# Patient Record
Sex: FEMALE | Race: WHITE | Employment: UNEMPLOYED | URBAN - METROPOLITAN AREA
[De-identification: names, ages, dates, MRNs, and addresses within clinical notes are randomized per-mention and may not be internally consistent; named-entity substitution may affect disease eponyms.]

---

## 2018-02-21 ENCOUNTER — HOSPITAL ENCOUNTER (EMERGENCY)
Facility: CLINIC | Age: 58
Discharge: HOME | End: 2018-02-21
Attending: EMERGENCY MEDICINE | Admitting: EMERGENCY MEDICINE
Payer: COMMERCIAL

## 2018-02-21 ENCOUNTER — APPOINTMENT (OUTPATIENT)
Dept: GENERAL RADIOLOGY | Facility: CLINIC | Age: 58
End: 2018-02-21
Attending: EMERGENCY MEDICINE
Payer: COMMERCIAL

## 2018-02-21 ENCOUNTER — APPOINTMENT (OUTPATIENT)
Dept: CT IMAGING | Facility: CLINIC | Age: 58
End: 2018-02-21
Attending: EMERGENCY MEDICINE
Payer: COMMERCIAL

## 2018-02-21 VITALS
OXYGEN SATURATION: 98 % | TEMPERATURE: 97.3 F | HEIGHT: 64 IN | WEIGHT: 228 LBS | DIASTOLIC BLOOD PRESSURE: 79 MMHG | BODY MASS INDEX: 38.93 KG/M2 | RESPIRATION RATE: 11 BRPM | SYSTOLIC BLOOD PRESSURE: 139 MMHG | HEART RATE: 58 BPM

## 2018-02-21 DIAGNOSIS — M79.602 PAIN OF LEFT UPPER EXTREMITY: ICD-10-CM

## 2018-02-21 DIAGNOSIS — R91.8 PULMONARY NODULES: ICD-10-CM

## 2018-02-21 DIAGNOSIS — R68.84 JAW PAIN: ICD-10-CM

## 2018-02-21 LAB
ALBUMIN SERPL-MCNC: 3.3 G/DL (ref 3.4–5)
ALP SERPL-CCNC: 59 U/L (ref 40–150)
ALT SERPL W P-5'-P-CCNC: 28 U/L (ref 0–50)
ANION GAP SERPL CALCULATED.3IONS-SCNC: 8 MMOL/L (ref 3–14)
AST SERPL W P-5'-P-CCNC: 18 U/L (ref 0–45)
BASOPHILS # BLD AUTO: 0.1 10E9/L (ref 0–0.2)
BASOPHILS NFR BLD AUTO: 0.9 %
BILIRUB SERPL-MCNC: 0.2 MG/DL (ref 0.2–1.3)
BUN SERPL-MCNC: 14 MG/DL (ref 7–30)
CALCIUM SERPL-MCNC: 8.5 MG/DL (ref 8.5–10.1)
CHLORIDE SERPL-SCNC: 105 MMOL/L (ref 94–109)
CO2 SERPL-SCNC: 27 MMOL/L (ref 20–32)
CREAT SERPL-MCNC: 0.65 MG/DL (ref 0.52–1.04)
D DIMER PPP FEU-MCNC: 2.8 UG/ML FEU (ref 0–0.5)
DIFFERENTIAL METHOD BLD: NORMAL
EOSINOPHIL # BLD AUTO: 0.3 10E9/L (ref 0–0.7)
EOSINOPHIL NFR BLD AUTO: 4.9 %
ERYTHROCYTE [DISTWIDTH] IN BLOOD BY AUTOMATED COUNT: 13.2 % (ref 10–15)
GFR SERPL CREATININE-BSD FRML MDRD: >90 ML/MIN/1.7M2
GLUCOSE SERPL-MCNC: 98 MG/DL (ref 70–99)
HCT VFR BLD AUTO: 40.8 % (ref 35–47)
HGB BLD-MCNC: 13.7 G/DL (ref 11.7–15.7)
IMM GRANULOCYTES # BLD: 0 10E9/L (ref 0–0.4)
IMM GRANULOCYTES NFR BLD: 0.4 %
INTERPRETATION ECG - MUSE: NORMAL
INTERPRETATION ECG - MUSE: NORMAL
LIPASE SERPL-CCNC: 287 U/L (ref 73–393)
LYMPHOCYTES # BLD AUTO: 2.1 10E9/L (ref 0.8–5.3)
LYMPHOCYTES NFR BLD AUTO: 39.9 %
MCH RBC QN AUTO: 30.2 PG (ref 26.5–33)
MCHC RBC AUTO-ENTMCNC: 33.6 G/DL (ref 31.5–36.5)
MCV RBC AUTO: 90 FL (ref 78–100)
MONOCYTES # BLD AUTO: 0.4 10E9/L (ref 0–1.3)
MONOCYTES NFR BLD AUTO: 7.5 %
NEUTROPHILS # BLD AUTO: 2.5 10E9/L (ref 1.6–8.3)
NEUTROPHILS NFR BLD AUTO: 46.4 %
NRBC # BLD AUTO: 0 10*3/UL
NRBC BLD AUTO-RTO: 0 /100
PLATELET # BLD AUTO: 232 10E9/L (ref 150–450)
POTASSIUM SERPL-SCNC: 4 MMOL/L (ref 3.4–5.3)
PROT SERPL-MCNC: 6.6 G/DL (ref 6.8–8.8)
RBC # BLD AUTO: 4.54 10E12/L (ref 3.8–5.2)
SODIUM SERPL-SCNC: 140 MMOL/L (ref 133–144)
TROPONIN I SERPL-MCNC: <0.015 UG/L (ref 0–0.04)
TROPONIN I SERPL-MCNC: <0.015 UG/L (ref 0–0.04)
WBC # BLD AUTO: 5.3 10E9/L (ref 4–11)

## 2018-02-21 PROCEDURE — 85379 FIBRIN DEGRADATION QUANT: CPT | Performed by: EMERGENCY MEDICINE

## 2018-02-21 PROCEDURE — 85025 COMPLETE CBC W/AUTO DIFF WBC: CPT | Performed by: EMERGENCY MEDICINE

## 2018-02-21 PROCEDURE — 25000125 ZZHC RX 250: Performed by: EMERGENCY MEDICINE

## 2018-02-21 PROCEDURE — 93005 ELECTROCARDIOGRAM TRACING: CPT | Mod: 76

## 2018-02-21 PROCEDURE — 80053 COMPREHEN METABOLIC PANEL: CPT | Performed by: EMERGENCY MEDICINE

## 2018-02-21 PROCEDURE — 71046 X-RAY EXAM CHEST 2 VIEWS: CPT

## 2018-02-21 PROCEDURE — 71260 CT THORAX DX C+: CPT

## 2018-02-21 PROCEDURE — 93005 ELECTROCARDIOGRAM TRACING: CPT

## 2018-02-21 PROCEDURE — 25000128 H RX IP 250 OP 636: Performed by: EMERGENCY MEDICINE

## 2018-02-21 PROCEDURE — 83690 ASSAY OF LIPASE: CPT | Performed by: EMERGENCY MEDICINE

## 2018-02-21 PROCEDURE — 99285 EMERGENCY DEPT VISIT HI MDM: CPT | Mod: 25

## 2018-02-21 PROCEDURE — 84484 ASSAY OF TROPONIN QUANT: CPT | Performed by: EMERGENCY MEDICINE

## 2018-02-21 RX ORDER — IOPAMIDOL 755 MG/ML
78 INJECTION, SOLUTION INTRAVASCULAR ONCE
Status: COMPLETED | OUTPATIENT
Start: 2018-02-21 | End: 2018-02-21

## 2018-02-21 RX ADMIN — IOPAMIDOL 78 ML: 755 INJECTION, SOLUTION INTRAVENOUS at 03:30

## 2018-02-21 RX ADMIN — SODIUM CHLORIDE 100 ML: 9 INJECTION, SOLUTION INTRAVENOUS at 03:30

## 2018-02-21 NOTE — ED PROVIDER NOTES
"  History     Chief Complaint:  \"Left arm pain\"    The history is provided by the patient.      Ruth Colon is a 57 year old female with history of HLD who presents to the emergency department for evaluation of left arm pain. The patient had the onset of left arm pain around 2130 this evening while she was sitting down and watching television. She took 2 aspirin at 2300 with no improvement of her pain so she took 1 dose of her spouse's nitroglycerin. She had no relief and read online about symptoms of a heart attack, then started to feel pain in her jaw as well as some chills, prompting her visit to the ED. Here in the ED she reports that she still has the pain in her arm and jaw but she denies any chest pain.  No trouble breathing, nausea, vomiting, or other associated symptoms.  She notes that the pain has been constant and rates it 2/10 in severity. The pain is not affected by activity, though she does mention that she had one episode of chest pain this past January after she finished a [employer] class; however, the pain was brief and she did not seek medical assessment.  She has since done many [employer] classes with no recurrent symptoms.  She has never been told she had serious heart or lung problems.  She denies any leg swelling, recent injury, or other medical concerns.    Allergies:  Sulfa Drugs      Medications:    K-dur  Deltasone  Diflucan    Past Medical History:    Allergic rhinitis  Endometriosis  HLD  Iron deficiency anemia  Other and unspecified malignant neoplasm of skin of other and unspecified parts of face    Past Surgical History:    D&C  BTL  C section    Family History:    CAD  CVD  HTN  Lipids  Asthma    Social History:  Presents with spouse   Tobacco use: Never  Alcohol use: No  PCP: Physician No Ref-Primary    Marital Status:      Review of Systems   All other systems reviewed and are negative.    Physical Exam     Patient Vitals for the past 24 hrs:   BP Temp Temp src Pulse Heart " "Rate Resp SpO2 Height Weight   02/21/18 0448 139/79 - - - 58 11 98 % - -   02/21/18 0300 - - - - 67 12 - - -   02/21/18 0230 - - - - 51 14 95 % - -   02/21/18 0200 133/69 - - - - - - - -   02/21/18 0107 168/63 97.3  F (36.3  C) Temporal 58 - 18 100 % 1.626 m (5' 4\") 103.4 kg (228 lb)      Physical Exam  General: Nontoxic appearing woman sitting upright in room 1,  at bedside  HENT: mucous membranes moist, OP clear, FROM mandible, anterior neck soft and supple  CV: regular rate, regular rhythm, no lower or upper extremity edema, no JVD, palpable symmetric radial pulses, soft compartments in LUE  Resp: clear throughout, normal effort, no crackles or wheezing  GI: abdomen soft and nontender, no guarding, negative Alcantar's sign  MSK: no bony tenderness to chest, shoulder, face, or LUE  Skin: appropriately warm and dry, no erythema or vesicles to chest wall  Neuro: alert, clear speech, oriented, normal strength and sensation throughout all extremities, no meningismus, responds briskly and appropriately to all questions and commands.  Psych: slightly anxious, cooperative      Emergency Department Course   ECG:  Indication: Left arm pain  Completed at 1:08:58.  Read at 01:29.   Rate 54 bpm. NH interval 138. QRS duration 86. QT/QTc 484/458. P-R-T axes 53 -5 50.  Sinus bradycardia. Interpreted by Pieter Yousif MD.     ECG:  Indication: Left arm pain  Completed at 4:12:29.  Read at 4:18.   Rate 55 bpm. NH interval 152. QRS duration 86. QT/QTc 482/461. P-R-T axes 48 -33 30.   Sinus bradycardia. Changes noted. Interpreted by Pieter Yousif MD.    Imaging:  Radiographic findings were communicated with the patient and family who voiced understanding of the findings.  XR Chest 2 Views  IMPRESSION: No acute abnormality.  Preliminary reading per radiology.      CT Chest Pulmonary Embolism w Contrast  IMPRESSION:  1. There is no pulmonary embolus, aortic aneurysm or dissection.  2. There are two lung nodules " measuring up to 1.1 cm. Comparison to any old exam or six month follow-up CT is recommended.  Preliminary reading per radiology.      Imaging independently reviewed and agree with radiologist interpretation.      Laboratory:  CBC: WNL (WBC 5.3, HGB 13.7, )    CMP: Albumin 3.3 (L), Protein Total 6.6 (L), o/w WNL (Creatinine 0.65)   Lipase: 287   0254: Troponin: <0.015   0441: Troponin: <0.015   D-dimer: 2.8 (H)    Emergency Department Course:  Past medical records, nursing notes, and vitals reviewed.  0134: I performed an exam of the patient and obtained history, as documented above.      The patient was placed on continuous cardiac and pulse ox monitoring.    0258: I rechecked the patient. Explained findings to patient and spouse.   0410: I rechecked the patient. Explained findings to patient and spouse.    0450: I rechecked the patient. Findings and plan explained to the Patient and spouse. Patient discharged home with instructions regarding supportive care, medications, and reasons to return. The importance of close follow-up was reviewed.      0525: I called the patient at her home and notified her of the pulmonary nodules found on her CT imaging.    I personally reviewed the laboratory results with the Patient and spouse and answered all related questions prior to discharge.   Impression & Plan    Medical Decision Making:  Ruth Colon is a 57 year old female presents with left arm pain of unclear etiology. After reading possible signs of heart attack she developed some left jaw pain as well. Her exam reveals no clear etiology, giving consideration to aortic pathology, arterial occlusion, DVT, PE, pneumothorax, infection, CVA among others. Elevated D-dimer led to CT which shows no PE. I think this is likely a false positive d-dimer due to her rheumatologic condition. She politely declined any analgesics.  Acute coronary syndrome was considered, though with serial troponins and EKG's benign, I  think she is safe for discharge home with close outpatient follow up. Return precautions reviewed and agreed upon.      Diagnosis:    ICD-10-CM    1. Pain of left upper extremity M79.602    2. Jaw pain R68.84        Disposition:  Discharged to home with plan as outlined.      Volodymyr aLrry  2/21/2018    EMERGENCY DEPARTMENT  I, Volodymyr Larry, am serving as a scribe at 1:34 AM on 2/21/2018 to document services personally performed by Pieter Yousif MD based on my observations and the provider's statements to me.       Pieter Yousif MD  02/21/18 0660

## 2018-02-21 NOTE — ED AVS SNAPSHOT
Emergency Department    [address] [facility] [address]    Phone:  [phone]    Fax:  [phone]                                       Ruth Colon   MRN: [id number]    Department:   Emergency Department   Date of Visit:  2/21/2018           Patient Information     Date Of Birth          1960        Your diagnoses for this visit were:     Pain of left upper extremity     Jaw pain        You were seen by Pietre Yousif MD.      Follow-up Information     Follow up with [facility]. Schedule an appointment as soon as possible for a visit in 2 days.    Specialties:  Podiatry, Internal Medicine, Family Medicine    Contact information:    [address] [address] [phone]        Follow up with  Emergency Department.    Specialty:  EMERGENCY MEDICINE    Why:  As needed, If symptoms worsen    Contact information:    [address] [facility] [address] [phone]        Discharge Instructions       The cause of your symptoms is unclear, but does not appear to represent a heart attack, blood clot in her lungs, problem with your aorta, stroke, or other immediately dangerous condition.  No new medications are necessary at this time, though I recommend close follow-up through clinic and return to the emergency department for sudden worsening at any hour.    24 Hour Appointment Hotline       To make an appointment at any [facility], call [phone] ([phone]). If you don't have a family doctor or clinic, we will help you find one. [facility] clinics are conveniently located to serve the needs of you and your family.             Review of your medicines      Our records show that you are taking the medicines listed below. If these are incorrect, please call your family doctor or clinic.        Dose / Directions Last dose taken    CALCIUM 500 SUPPLEMENT 1250 MG Chew        one tablet daily    Refills:  0        DIFFERIN 0.1 % cream   Generic drug:  adapalene        use as directed   Refills:  0        DIFLUCAN 150 MG tablet   Quantity:  1   Generic drug:  fluconazole        ONE TABLET FOR ONE DOSE   Refills:  0        diphenhydrAMINE 25 MG tablet   Commonly known as:  BENADRYL   Dose:  25 mg   Quantity:  30 tablet        Take 1 tablet (25 mg) by mouth every 6 hours as needed for itching   Refills:  0        EPINEPHrine 0.3 MG/0.3ML injection 2-pack   Commonly known as:  EPIPEN/ADRENACLICK/or ANY BX GENERIC EQUIV   Dose:  0.3 mg   Quantity:  2 each        Inject 0.3 mLs (0.3 mg) into the muscle once as needed for anaphylaxis   Refills:  0        Fiber Tabs        four tablets daily   Refills:  0        fish oil-omega-3 fatty acids 1000 MG capsule        1 tab bid   Refills:  0        KEFLEX 500 MG capsule   Quantity:  21   Generic drug:  cephALEXin        TAKE ONE CAPSULE3 TIMES DAILY   Refills:  0        MULTIVITAMIN TABS   OR        one tablet daily   Refills:  0        NASONEX 50 MCG/ACT spray   Quantity:  1   Generic drug:  mometasone        2 sprays each nostril Once daily for allergies   Refills:  prn within a year        OVER-THE-COUNTER        alpha lipoic acid, 1 tab qd   Refills:  0        PATANOL 0.1 % ophthalmic solution   Quantity:  1   Generic drug:  olopatadine        1 drop each eye 3x per week   Refills:  3        potassium chloride 10 MEQ tablet   Commonly known as:  K-TAB,KLOR-CON   Dose:  10 mEq   Quantity:  30 tablet        Take 1 tablet (10 mEq) by mouth daily   Refills:  0        predniSONE 20 MG tablet   Commonly known as:  DELTASONE   Quantity:  10 tablet        Take two tablets (= 40mg) each day for 5 (five) days   Refills:  0        vitamin E 400 UNIT capsule        one capsule daily   Refills:  0                Procedures and tests performed during your visit     Procedure/Test Number of Times Performed    CBC with platelets differential 1    CT Chest Pulmonary Embolism w  Contrast 1    Comprehensive metabolic panel 1    D dimer quantitative 1    EKG 12-lead, tracing only 2    Lipase 1    Troponin I 1    Troponin I (now) 1    XR Chest 2 Views 1      Orders Needing Specimen Collection     None      Pending Results     Date and Time Order Name Status Description    2/21/2018 0254 CT Chest Pulmonary Embolism w Contrast Preliminary     2/21/2018 0141 XR Chest 2 Views Preliminary             Pending Culture Results     No orders found from 2/19/2018 to 2/22/2018.            Pending Results Instructions     If you had any lab results that were not finalized at the time of your Discharge, you can call the ED Lab Result RN at [phone]. You will be contacted by this team for any positive Lab results or changes in treatment. The nurses are available 7 days a week from 10A to 6:30P.  You can leave a message 24 hours per day and they will return your call.        Test Results From Your Hospital Stay        2/21/2018  2:21 AM      Component Results     Component Value Ref Range & Units Status    WBC 5.3 4.0 - 11.0 10e9/L Final    RBC Count 4.54 3.8 - 5.2 10e12/L Final    Hemoglobin 13.7 11.7 - 15.7 g/dL Final    Hematocrit 40.8 35.0 - 47.0 % Final    MCV 90 78 - 100 fl Final    MCH 30.2 26.5 - 33.0 pg Final    MCHC 33.6 31.5 - 36.5 g/dL Final    RDW 13.2 10.0 - 15.0 % Final    Platelet Count 232 150 - 450 10e9/L Final    Diff Method Automated Method  Final    % Neutrophils 46.4 % Final    % Lymphocytes 39.9 % Final    % Monocytes 7.5 % Final    % Eosinophils 4.9 % Final    % Basophils 0.9 % Final    % Immature Granulocytes 0.4 % Final    Nucleated RBCs 0 0 /100 Final    Absolute Neutrophil 2.5 1.6 - 8.3 10e9/L Final    Absolute Lymphocytes 2.1 0.8 - 5.3 10e9/L Final    Absolute Monocytes 0.4 0.0 - 1.3 10e9/L Final    Absolute Eosinophils 0.3 0.0 - 0.7 10e9/L Final    Absolute Basophils 0.1 0.0 - 0.2 10e9/L Final    Abs Immature Granulocytes 0.0 0 - 0.4 10e9/L Final    Absolute Nucleated RBC  0.0  Final         2/21/2018  2:54 AM      Component Results     Component Value Ref Range & Units Status    Sodium 140 133 - 144 mmol/L Final    Potassium 4.0 3.4 - 5.3 mmol/L Final    Chloride 105 94 - 109 mmol/L Final    Carbon Dioxide 27 20 - 32 mmol/L Final    Anion Gap 8 3 - 14 mmol/L Final    Glucose 98 70 - 99 mg/dL Final    Urea Nitrogen 14 7 - 30 mg/dL Final    Creatinine 0.65 0.52 - 1.04 mg/dL Final    GFR Estimate >90 >60 mL/min/1.7m2 Final    Non  GFR Calc    GFR Estimate If Black >90 >60 mL/min/1.7m2 Final    African American GFR Calc    Calcium 8.5 8.5 - 10.1 mg/dL Final    Bilirubin Total 0.2 0.2 - 1.3 mg/dL Final    Albumin 3.3 (L) 3.4 - 5.0 g/dL Final    Protein Total 6.6 (L) 6.8 - 8.8 g/dL Final    Alkaline Phosphatase 59 40 - 150 U/L Final    ALT 28 0 - 50 U/L Final    AST 18 0 - 45 U/L Final         2/21/2018  2:49 AM      Component Results     Component Value Ref Range & Units Status    Lipase 287 73 - 393 U/L Final         2/21/2018  2:54 AM      Component Results     Component Value Ref Range & Units Status    Troponin I ES <0.015 0.000 - 0.045 ug/L Final    The 99th percentile for upper reference range is 0.045 ug/L.  Troponin values   in the range of 0.045 - 0.120 ug/L may be associated with risks of adverse   clinical events.           2/21/2018  2:35 AM      Component Results     Component Value Ref Range & Units Status    D Dimer 2.8 (H) 0.0 - 0.50 ug/ml FEU Final    This D-dimer assay is intended for use in conjunction with a clinical pretest   probability assessment model to exclude pulmonary embolism (PE) and deep   venous thrombosis (DVT) in outpatients suspected of PE or DVT. The cut-off   value is 0.5 ug/mL FEU.           2/21/2018  1:59 AM      Narrative     XR CHEST 2 VW  2/21/2018 1:53 AM     HISTORY: Left-sided pain today, no trauma.    COMPARISON: None.    FINDINGS: The heart size is normal. The lungs are clear. No  pneumothorax or pleural effusion.         Impression     IMPRESSION: No acute abnormality.         2/21/2018  4:01 AM      Narrative     CT CHEST PULMONARY EMBOLISM W CONTRAST  2/21/2018 3:34 AM    HISTORY: Shortness of breath, evaluate for pulmonary embolus, acute  left-sided pain, elevated D-dimer.     TECHNIQUE: Scans obtained from the apices through the diaphragm with  IV contrast. 78 mL Isovue-370 injected. Radiation dose for this scan  was reduced using automated exposure control, adjustment of the mA  and/or kV according to patient size, or iterative reconstruction  technique.    COMPARISON: None.    FINDINGS: Evaluation of the pulmonary arterial system shows no  evidence of embolus. There is no aortic aneurysm or dissection. The  heart is mildly enlarged. Very small pericardial effusion. There is no  mediastinal, hilar or axillary lymph node enlargement. There is a 1.1  cm ovoid nodule or nodular scar in the right lung anteriorly on image  number 77. 0.4 cm subpleural nodule in the left lung base laterally.  Mild dependent atelectasis bilaterally. No pneumothorax or pleural  effusion. Images through the upper abdomen show no acute  abnormalities.        Impression     IMPRESSION:  1. There is no pulmonary embolus, aortic aneurysm or dissection.  2. There are two lung nodules measuring up to 1.1 cm. Comparison to  any old exam or six month follow-up CT is recommended.         2/21/2018  4:41 AM      Component Results     Component Value Ref Range & Units Status    Troponin I ES <0.015 0.000 - 0.045 ug/L Final    The 99th percentile for upper reference range is 0.045 ug/L.  Troponin values   in the range of 0.045 - 0.120 ug/L may be associated with risks of adverse   clinical events.                  Clinical Quality Measure: Blood Pressure Screening     Your blood pressure was checked while you were in the emergency department today. The last reading we obtained was  BP: 139/79 . Please read the guidelines below about what these numbers mean and  "what you should do about them.  If your systolic blood pressure (the top number) is less than 120 and your diastolic blood pressure (the bottom number) is less than 80, then your blood pressure is normal. There is nothing more that you need to do about it.  If your systolic blood pressure (the top number) is 120-139 or your diastolic blood pressure (the bottom number) is 80-89, your blood pressure may be higher than it should be. You should have your blood pressure rechecked within a year by a primary care provider.  If your systolic blood pressure (the top number) is 140 or greater or your diastolic blood pressure (the bottom number) is 90 or greater, you may have high blood pressure. High blood pressure is treatable, but if left untreated over time it can put you at risk for heart attack, stroke, or kidney failure. You should have your blood pressure rechecked by a primary care provider within the next 4 weeks.  If your provider in the emergency department today gave you specific instructions to follow-up with your doctor or provider even sooner than that, you should follow that instruction and not wait for up to 4 weeks for your follow-up visit.        Thank you for choosing [facility]       Thank you for choosing [facility] for your care. Our goal is always to provide you with excellent care. Hearing back from our patients is one way we can continue to improve our services. Please take a few minutes to complete the written survey that you may receive in the mail after you visit with us. Thank you!        [employer] Information     [employer] lets you send messages to your doctor, view your test results, renew your prescriptions, schedule appointments and more. To sign up, go to [email or web address] . Click on \"Log in\" on the left side of the screen, which will take you to the Welcome page. Then click on \"Sign up Now\" on the right side of the page.     You will be asked to enter the access code listed below, as well as " some personal information. Please follow the directions to create your username and password.     Your access code is: [email or web address]  Expires: 2018  4:49 AM     Your access code will  in 90 days. If you need help or a new code, please call your [place] clinic or [phone].        Care EveryWhere ID     This is your Care EveryWhere ID. This could be used by other organizations to access your [place] medical records  [id number]        Equal Access to Services     [facility] Hospital[facility] : Cedric sortoo Sotaina, waaxda luqadaha, qaybta kaalmada adeodilia, torito colon . So [facility] [phone].    ATENCIÓN: Si habla español, tiene a lowe disposición servicios gratuitos de asistencia lingüística. Llame al [phone].    We comply with applicable federal civil rights laws and [place] laws. We do not discriminate on the basis of race, color, national origin, age, disability, sex, sexual orientation, or gender identity.            After Visit Summary       This is your record. Keep this with you and show to your community pharmacist(s) and doctor(s) at your next visit.

## 2018-02-21 NOTE — DISCHARGE INSTRUCTIONS
The cause of your symptoms is unclear, but does not appear to represent a heart attack, blood clot in her lungs, problem with your aorta, stroke, or other immediately dangerous condition.  No new medications are necessary at this time, though I recommend close follow-up through clinic and return to the emergency department for sudden worsening at any hour.

## 2018-02-21 NOTE — ED AVS SNAPSHOT
Emergency Department    [address] [place] [address]    Phone:  [phone]    Fax:  [phone]                                       Ruth Colon   MRN: [id number]    Department:   Emergency Department   Date of Visit:  2/21/2018           After Visit Summary Signature Page     I have received my discharge instructions, and my questions have been answered. I have discussed any challenges I see with this plan with the nurse or doctor.    ..........................................................................................................................................  Patient/Patient Representative Signature      ..........................................................................................................................................  Patient Representative Print Name and Relationship to Patient    ..................................................               ................................................  Date                                            Time    ..........................................................................................................................................  Reviewed by Signature/Title    ...................................................              ..............................................  Date                                                            Time

## 2019-03-19 ENCOUNTER — HOSPITAL ENCOUNTER (OUTPATIENT)
Dept: SPEECH THERAPY | Facility: CLINIC | Age: 59
Setting detail: THERAPIES SERIES
End: 2019-03-19
Attending: PHYSICIAN ASSISTANT
Payer: COMMERCIAL

## 2019-03-19 PROCEDURE — 92524 BEHAVRAL QUALIT ANALYS VOICE: CPT | Mod: GN | Performed by: STUDENT IN AN ORGANIZED HEALTH CARE EDUCATION/TRAINING PROGRAM

## 2019-03-19 NOTE — PROGRESS NOTES
" 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   Surgical/Medical history reviewed Yes   Pertinent History Of Current Problem 57yo female presenting with dysphonia and vocal fatigue.  Pt reports that her voice is weak and it is very difficult for her to project her voice or talk in a crowded room.  Her voice fatigues with use and her throat will become \"sore and achy.\"  Symptoms \"come and go.\"  On recent laryngoscopy, she reports that her vocal folds were \"split\" and that she might be \"talking the wrong way.\"  Redness in the laryngeal area was also observed and she was prescribed reflux medications, but she notes that she often forgets to take these.  She notes that her throat is sore when she swallows, but this has been going on longer than the voice problems.  PMH significant for sinus problems (medications, previous sinus surgery), asthma, BABAK, grinding teeth (uses mouth guard).   Prior Level of Functioning Same problem relapsing/remitting.   Prior Level Of Function Comment Pt reports that she has always had a weaker voice, but now voice epsiodes are becoming longer and more frequent.   Patient Role/employment History Retired   General Observations Pt reports that her voice has been worse yesterday and today, and she is not sure why.   Patient/family Goals To have a normal voice quality, to be able to project her voice without excessive fatigue   Personal Rating / Voice Use Rating   Comments Voice problems are frequently upsetting and make her feel handicapped.  People sometimes " have difficulty hearing her because of her voice, especially in background noise.  Pt sometimes has to strain to produce voice and sometimes restricts her social activities because of her voice.  People occasionally ask her about her voice.  VHI-10: 15/40.   Evaluation Results   Voice Observations VISIBLE TENSION: neck.  PALPATION OF THYROHYOID REGION: firm musculature with reduced thyrohyoid space, L>R, and tenderness to palpation.   Voice Profile during conversation, 1 min monologue and paragraph reading   Voice Quality Airy;Phonation gap   Voice quality comments SPEECH: Consistent moderate strain and consistent mild-moderate breathiness with reduced volume and intermittent phonation breaks.  SINGING: largely consistent with speech, but with no phonation breaks.  VOWEL PROLONGATION: comfortable pitch /a/: F#3, slightly louder, but otherwise consistent with speech; high pitch /a/: F#4, less breathiness but increased strain; low pitch /a/: F#3, increased breathiness and strain, low volume.  THERAPY PROBES: most improvement in voice quality with forward focus, diaphragmatic engagement, and inhalation phonation techniques.    Voice quality severity rating continuum (1=Severe, 7=WNL) 4  (CAPE-V Overall Severity: 52/100)   Breath control Tight   Breath Control comments Excessive thoracic muscle use pattern on inspiration for speech.  Inspirations are inadequate for speech in frequency and volume.  Poor respiratory/phonatory coordination.   Breath control severity rating continuum (1=Severe, 7=WNL) 5   Voice Use / Effort Pinched / squeezed larynx;Contraction of neck muscles;Throat push   Voice Use / Effort comments Pt rates her current phonatory effort for speech as 3-4/10 (10 is maximum effort).   Voice use / Effort severity rating continuum (1=Severe, 7=WNL) 5   Fundamental frequency (Hz) (Centered around F#3)   Pitch /Frequency Description Too low   Pitch / Frequency comments Reduced pitch range with increased strain  and phonation breaks on pitch increase.   Pitch / Frequency severity rating continuum (1=Severe, 7=WNL) 5   Volume Too quiet   Volume comments Volume for conversational speech is moderately reduced, but still adequate for the setting (1:1 conversation in quiet room).  A whisper is normal.  Soft phonation has increased phonation breaks.  Loud phonation is stronger and less hoarse, but with neck involvement in phonation.   Volume severity rating continuum (1=Severe, 7=WNL) 5   Neuromuscular Control WNL   Neuromuscular Control severity rating continuum (1=Severe, 7=WNL) 7   Resonance WNL   Resonance severity rating continuum (1=Severe, 7=WNL) 7   Comments Moderate dysphonia characterized by strain, breathiness, phonation breaks, reduced volume, reduced pitch range, poor respiratory/phonatory coordination, and increased phonatory effort with tight laryngeal musculature and neck involvement during phonation.   Adduction /Abduction Function   Laryngeal diadokinetic speed (Mildly slow)   Laryngeal diadokinetic strength Sluggish   Laryngeal diadokinetic consistency Regular   Adduction / Abduction function scale Age 11 - 65, norm per sec:  5+   Function of Lengtheners / Shorteners (CT and TA Muscles)   Pitch glides Upper pitches more dysphonic;Limited range  (Lowest: C3; Highest: G4 (strained with voice breaks))   General Therapy Interventions   Planned Therapy Interventions Voice   Voice Breath flow to sound flow;Voice quality/pitch or volume tasks;Resonant voice techniques;No larynx effort practice;Larynx movement/coordination   Impressions and Recommendations   Communication Diagnosis Dysphonia   Summary Ms. Colon presents with moderate dysphonia characterized by strain, breathiness, phonation breaks, reduced volume, reduced pitch range, poor respiratory/phonatory coordination, and increased phonatory effort with tight laryngeal musculature and neck involvement during phonation.  Based on today's evaluation and  previous laryngoscopy, dysphonia is primarily accounted for by hyperfunction and imbalance in function of the intrinsic and extrinsic laryngeal musculature.  Patient is unable to meet her vocal demands because of her voice problems.   Recommendations A course of skilled speech therapy is recommended in order to optimize vocal technique, improve voice quality and volume, and promote reduced laryngeal effort, fatigue, and irritation so that patient is able to meet her vocal demands.   Frequency and Duration 1x/week for 7 weeks with 2-3 monthly follow-ups   Prognosis  Good with intervention   Risks and Benefits of Treatment have been explained. Yes   Patient & /or Caregiver  in agreement with plan of care Yes   Patient Education SLP provided education regarding muscle tension dysphonia causes, symptoms, and treatment options.  SLP answered pt's questions regarding laryngopharyngeal reflux and management, providing a written handout as well.  <8 minutes skilled therapy.   Educational Assessment   Barriers to Learning No barriers   Preferred Learning Style Listening;Reading;Demonstration;Pictures / Video   Voice Goals   Voice Goals 1;2;3   Voice Goal 1   Goal Identifier Generalization   Goal Description Patient will report a week of typical activities in which dysphonia, effort, and fatigue do not exceed a level of 2 out of 10, 90% of the time, so that patient is able to meet her vocal demands.   Target Date 07/17/19   Voice Goal 2   Goal Identifier Voice quality   Goal Description In a 20-minute speech task, patient will demonstrate appropriate volume with strain, breathiness, and phonation breaks that do not exceed a level of 2 out of 10, 90% of the time by SLP judgment, so that patient is able to meet her voice quality demands.   Target Date 07/17/19   Voice Goal 3   Goal Identifier Massage   Goal Description Patient will learn, demonstrate, and implement use of circumlaryngeal massage exercises independently 1-2x per  day, every other day, in order to promote reduced laryngeal discomfort and tension.   Target Date 07/17/19   Total Session Time   Voice Minutes (12142) 60   Total Evaluation Time 60   Therapy Certification   Certification date from 03/19/19   Certification date to 07/17/19   Medical Diagnosis Dysphonia     Thank you for the referral of this patient.    Allison Alpers, B.A. (music), M.A., CCC-SLP  Speech-Language Pathologist  Certificate of Vocology  [facility]  [phone]

## 2019-10-31 NOTE — ADDENDUM NOTE
Encounter addended by: Alpers, Allison E, SLP on: 10/31/2019 11:38 AM   Actions taken: Episode resolved

## 2020-01-28 ENCOUNTER — HOSPITAL ENCOUNTER (OUTPATIENT)
Dept: SPEECH THERAPY | Facility: CLINIC | Age: 60
Setting detail: THERAPIES SERIES
End: 2020-01-28
Attending: SPECIALIST
Payer: COMMERCIAL

## 2020-01-28 PROCEDURE — 92507 TX SP LANG VOICE COMM INDIV: CPT | Mod: GN | Performed by: STUDENT IN AN ORGANIZED HEALTH CARE EDUCATION/TRAINING PROGRAM

## 2020-01-28 PROCEDURE — 92524 BEHAVRAL QUALIT ANALYS VOICE: CPT | Mod: GN | Performed by: STUDENT IN AN ORGANIZED HEALTH CARE EDUCATION/TRAINING PROGRAM

## 2020-02-04 NOTE — PROGRESS NOTES
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   Surgical/Medical history reviewed Yes   Pertinent History Of Current Problem 60yo female previously seen for evaluation by this clinician on 3/19/19 presenting with continued dysphonia and vocal fatigue.  Pt was unable to complete therapy as recommended at her last evaluation d/t lack of insurance coverage.  Since the last evaluation, pt reports that her voice is usually hoarse.  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  Her voice fatigues with use and can cause throat pain.  Her throat will feel tight with swallowing, which is worse after voice use.  The cold weather also makes her symptoms worse.  She has had repeated sinus infections and URIs since September 2019, which has also made her voice and throat symptoms worse.  She reports some throat clearing to clear mucus in the mornings.  She continues to have heartburn on and off.  She is currently managing her symptoms through dietary modifications (especially eliminating dairy), use of dairy assist medication, and eating smaller meals.  Reflux symptoms can make her voice symptoms worse.  PMH also significant for sinus problems, asthma, BABAK, grinding teeth.   Prior Level of Functioning Same problem relapsing/remitting.   Prior Level Of Function Comment Pt was previously seen for evaluation by this SLP on 3/19/19, but pt was unable to complete a course of therapy as recommended d/t lack of  insurance coverage.   Patient Role/employment History Retired;Employed  ([employer])   General Observations Pt reports that her voice is a little worse than usual today d/t PND.   Patient/family Goals To have a normal voice quality, to be able to project her voice without excessive fatigue   Personal Rating / Voice Use Rating   Describe the amount of voice use required for your job Moderate   Describe the intensity of voice use required for your job Moderate   Describe the amount of typical non-work voice use Moderate   Describe the intensity of typical non-work voice use Moderate   Comments People frequently have difficulty hearing her because of her voice, including in background noise.  She frequently strains to produce voice and voice quality is frequently unpredictable.  She sometimes restricts her social activities because of her voice problems.  She occasionally feels left out of conversations because of her voice, and people occasionally ask her about what's wrong with her voice.  Voice problems are occasionally upsetting and make her feel handicapped.  VHI-10: 18/40.   Evaluation Results   Voice Observations VISIBLE TENSION: neck.  PALPATION OF THYROHYOID REGION: firm musculature with reduced thyrohyoid space at rest and additional closure on phonation.  Pt reporting tenderness from the thyrohyoid space to cricothyroid area.   Voice Profile during conversation, 1 min monologue and paragraph reading   Voice Quality Airy;Raspy;Phonation gap   Voice quality comments SPEECH: Consistent moderate strain, consistent mild breathiness, and intermittent mild roughness with occasional phonation breaks at the ends of sentences.  SINGING: Consistent breathiness, no roughness, and strain that increases with pitch increase.  Overall improved compared to speech.  THERAPY PROBES: good improvement in voice quality with flow mode, forward resonance, semi-occluded vocal tract, and  diaphragmatic engagement techniques.   Voice quality severity rating continuum (1=Severe, 7=WNL) 4  (CAPE-V Overall Severity: 42/100)   Breath control Tight;Tense   Breath Control comments Excessive thoracic muscle use pattern on inspiration for speech.  Inspirations are inadequate for speech in volume and frequency.  Talks to past end of breath stream with poor respiratory/phonatory coordination.   Breath control severity rating continuum (1=Severe, 7=WNL) 4   Voice Use / Effort Pinched / squeezed larynx;Contraction of neck muscles;Throat push   Voice Use / Effort comments Pt rates her current phonatory effort for speech as 6/10 (10 is maximum effort), noting that effort can increase to 8/10 with her most severe symptoms.   Voice use / Effort severity rating continuum (1=Severe, 7=WNL) 4   Fundamental frequency (Hz)   (Centered around D3)   Pitch /Frequency Description Too low   Pitch / Frequency comments Low habitual pitch with reduced pitch range   Pitch / Frequency severity rating continuum (1=Severe, 7=WNL) 5   Volume Too quiet   Volume comments Volume for conversational speech is reduced, particularly at ends of sentences, but volume is still adequate for the setting (1:1 conversation in quiet room).  A whisper is normal.  Soft phonation is mildly breathy with brief phonation breaks.  Loud phonation is strained with neck involvement, but with good volume increase.   Volume severity rating continuum (1=Severe, 7=WNL) 5   Neuromuscular Control WNL   Neuromuscular Control severity rating continuum (1=Severe, 7=WNL) 7   Resonance WNL   Resonance severity rating continuum (1=Severe, 7=WNL) 7   Comments Moderate dysphonia characterized by strain, breathiness, roughness, phonation breaks, reduced volume, low habitual pitch and reduced pitch range, poor respiratory/phonatory coordination, and increased phonatory effort with tight laryngeal musculature and neck involvement during phonation.   Adduction /Abduction  Function   Laryngeal diadokinetic speed   (Mildly slow)   Laryngeal diadokinetic strength Sluggish  (Strained)   Laryngeal diadokinetic consistency Regular   Adduction / Abduction function scale Age 11 - 65, norm per sec:  5+   Function of Lengtheners / Shorteners (CT and TA Muscles)   Pitch glides Upper pitches more dysphonic;Limited range  (Lowest: Bb2; Highest: F#4, strained with voice breaks)   General Therapy Interventions   Planned Therapy Interventions Voice   Voice Breath flow to sound flow;Voice quality/pitch or volume tasks;Resonant voice techniques;No larynx effort practice   Impressions and Recommendations   Communication Diagnosis Dysphonia   Summary Ms. Colon presents with moderate dysphonia characterized by strain, breathiness, roughness, phonation breaks, reduced volume, low habitual pitch and reduced pitch range, poor respiratory/phonatory coordination, and increased phonatory effort with tight laryngeal musculature and neck involvement during phonation.  Based on today's evaluation and previous evaluations/laryngoscopy with ENT, dysphonia is primarily accounted for by the hyperfunction and imbalance in function of the intrinsic and extrinsic laryngeal musculature consistent with a muscle tension dysphonia.  Patient is unable to meet her vocal demands because of her voice problems.   Recommendations A course of skilled speech therapy is recommended in order to optimize vocal technique, improve voice quality and volume, and promote reduced laryngeal effort, fatigue, and irritation so that patient is able to meet her vocal demands at work and at home.   Frequency and Duration 1x/week for 7 weeks with 2-3 monthly follow-ups   Prognosis  Good with intervention   Risks and Benefits of Treatment have been explained. Yes   Patient & /or Caregiver  in agreement with plan of care Yes   Patient Education SLP provided education regarding evaluation findings and proposed POC.  Therapy initiated today.    Educational Assessment   Barriers to Learning No barriers   Preferred Learning Style Listening;Reading;Demonstration;Pictures / Video   Voice Goal 1   Goal Identifier Generalization   Goal Description Patient will report a week of typical activities in which dysphonia, effort, and fatigue do not exceed a level of 2 out of 10, 90% of the time, so that patient is able to meet her vocal demands at work and at home.   Target Date 07/28/20   Voice Goal 2   Goal Identifier Voice quality   Goal Description In a 20-minute speech task, patient will demonstrate strain, breathiness, roughness, and phonation breaks that do not exceed a level of 2 out of 10, 90% of the time by SLP judgment, so that patient is able to meet her voice quality demands.   Target Date 07/28/20   Voice Goal 3   Goal Identifier Massage   Goal Description Patient will learn, demonstrate, and implement use of circumlaryngeal massage exercises independently 1-2x per day, every other day, in order to promote reduced laryngeal discomfort and tension.   Target Date 04/28/20   Total Session Time   Voice Minutes (15080) 35   Total Evaluation Time 60     Thank you for the referral of this patient.    Allison Alpers, B.A. (music), [email or web address], CCC-SLP  Speech-Language Pathologist  Certificate of Vocology  [facility]  [phone]

## 2020-02-06 ENCOUNTER — HOSPITAL ENCOUNTER (OUTPATIENT)
Dept: SPEECH THERAPY | Facility: CLINIC | Age: 60
Setting detail: THERAPIES SERIES
End: 2020-02-06
Attending: SPECIALIST
Payer: COMMERCIAL

## 2020-02-06 PROCEDURE — 92507 TX SP LANG VOICE COMM INDIV: CPT | Mod: GN | Performed by: STUDENT IN AN ORGANIZED HEALTH CARE EDUCATION/TRAINING PROGRAM

## 2020-02-06 NOTE — DISCHARGE INSTRUCTIONS
Voice Therapy 2/6/2020      Relaxed, belly/diaphragmatic breathing  o Abdomen should inflate when you breathe in and deflate when you breathe out     Cup and bubbles exercise (tissue mobilization)  o Put 1 in. of water in a cup  o Start just blowing bubbles through the straw WITHOUT voice  - It should feel easy in your throat  o Add voice at a comfortable pitch, making sure that the bubbles stay continuous and your throat is still relaxed  o Toggle between two pitches  o Do gentle glides up and down in pitch  o Do repeated accents, like you re revving the engine of a car  o Sing the tune of Happy Birthday or other songs    **Practice these exercises several times every day, for 2-5 minutes at a time

## 2020-02-12 ENCOUNTER — HOSPITAL ENCOUNTER (OUTPATIENT)
Dept: SPEECH THERAPY | Facility: CLINIC | Age: 60
Setting detail: THERAPIES SERIES
End: 2020-02-12
Attending: SPECIALIST
Payer: COMMERCIAL

## 2020-02-12 PROCEDURE — 92507 TX SP LANG VOICE COMM INDIV: CPT | Mod: GN | Performed by: STUDENT IN AN ORGANIZED HEALTH CARE EDUCATION/TRAINING PROGRAM

## 2020-02-12 NOTE — DISCHARGE INSTRUCTIONS
Vocal Function Exercises   2/12/2020  Rationale:   Physical therapy for the vocal folds,  designed to strengthen and balance the laryngeal musculature and to create a balance among airflow, the laryngeal muscular effort, and the tone placement/resonance.    Do these exercises two times each, two times per day.  1. Do the cup/bubbles exercises as a warm-up for 1-3 minutes.    2. Glide from your lowest note to your highest note on a buzzy  o  or using a straw.  Feel a buzz on the lips or on the straw (buzzy feeling is diminished on the highest notes).    This improves muscular control and flexibility.    Goal: Glide slowly.    3. Glide from your highest note to your lowest note on a buzzy  o  or using a straw.     This improves muscular control and flexibility.    Goal: Glide slowly.    4. Sustain a comfortable pitch through the straw for as long as you can.  Then sustain a high pitch for as long as you can.  Then sustain a low pitch for as long as you can.    This increases the power of the muscles.    Goal: Sustain as long as you can sustain  s.   (___15+___ seconds).

## 2020-02-26 ENCOUNTER — HOSPITAL ENCOUNTER (OUTPATIENT)
Dept: SPEECH THERAPY | Facility: CLINIC | Age: 60
Setting detail: THERAPIES SERIES
End: 2020-02-26
Attending: SPECIALIST
Payer: COMMERCIAL

## 2020-02-26 PROCEDURE — 92507 TX SP LANG VOICE COMM INDIV: CPT | Mod: GN | Performed by: STUDENT IN AN ORGANIZED HEALTH CARE EDUCATION/TRAINING PROGRAM

## 2020-03-12 ENCOUNTER — HOSPITAL ENCOUNTER (OUTPATIENT)
Dept: SPEECH THERAPY | Facility: CLINIC | Age: 60
Setting detail: THERAPIES SERIES
End: 2020-03-12
Attending: SPECIALIST
Payer: COMMERCIAL

## 2020-03-12 PROCEDURE — 92507 TX SP LANG VOICE COMM INDIV: CPT | Mod: GN | Performed by: STUDENT IN AN ORGANIZED HEALTH CARE EDUCATION/TRAINING PROGRAM

## 2021-03-25 ENCOUNTER — IMMUNIZATION (OUTPATIENT)
Dept: NURSING | Facility: CLINIC | Age: 61
End: 2021-03-25
Payer: COMMERCIAL

## 2021-03-25 PROCEDURE — 91300 PR COVID VAC PFIZER DIL RECON 30 MCG/0.3 ML IM: CPT

## 2021-03-25 PROCEDURE — 0001A PR COVID VAC PFIZER DIL RECON 30 MCG/0.3 ML IM: CPT

## 2021-04-15 ENCOUNTER — IMMUNIZATION (OUTPATIENT)
Dept: NURSING | Facility: CLINIC | Age: 61
End: 2021-04-15
Attending: INTERNAL MEDICINE
Payer: COMMERCIAL

## 2021-04-15 PROCEDURE — 91300 PR COVID VAC PFIZER DIL RECON 30 MCG/0.3 ML IM: CPT

## 2021-04-15 PROCEDURE — 0002A PR COVID VAC PFIZER DIL RECON 30 MCG/0.3 ML IM: CPT

## 2021-05-08 ENCOUNTER — HEALTH MAINTENANCE LETTER (OUTPATIENT)
Age: 61
End: 2021-05-08

## 2021-10-23 ENCOUNTER — HEALTH MAINTENANCE LETTER (OUTPATIENT)
Age: 61
End: 2021-10-23

## 2022-06-04 ENCOUNTER — HEALTH MAINTENANCE LETTER (OUTPATIENT)
Age: 62
End: 2022-06-04

## 2022-10-10 ENCOUNTER — HEALTH MAINTENANCE LETTER (OUTPATIENT)
Age: 62
End: 2022-10-10

## 2023-04-24 ENCOUNTER — LAB REQUISITION (OUTPATIENT)
Dept: LAB | Facility: CLINIC | Age: 63
End: 2023-04-24
Payer: COMMERCIAL

## 2023-04-24 DIAGNOSIS — L08.9 LOCAL INFECTION OF THE SKIN AND SUBCUTANEOUS TISSUE, UNSPECIFIED: ICD-10-CM

## 2023-04-24 PROCEDURE — 87070 CULTURE OTHR SPECIMN AEROBIC: CPT | Mod: ORL | Performed by: DERMATOLOGY

## 2023-04-27 LAB — BACTERIA WND CULT: NORMAL

## 2023-05-27 ENCOUNTER — HEALTH MAINTENANCE LETTER (OUTPATIENT)
Age: 63
End: 2023-05-27

## 2023-06-11 ENCOUNTER — HEALTH MAINTENANCE LETTER (OUTPATIENT)
Age: 63
End: 2023-06-11